# Patient Record
Sex: MALE | Race: BLACK OR AFRICAN AMERICAN | Employment: STUDENT | URBAN - METROPOLITAN AREA
[De-identification: names, ages, dates, MRNs, and addresses within clinical notes are randomized per-mention and may not be internally consistent; named-entity substitution may affect disease eponyms.]

---

## 2017-10-21 ENCOUNTER — HOSPITAL ENCOUNTER (EMERGENCY)
Age: 22
Discharge: HOME | End: 2017-10-21
Attending: PEDIATRICS
Payer: SELF-PAY

## 2017-10-21 VITALS
HEART RATE: 79 BPM | OXYGEN SATURATION: 99 % | TEMPERATURE: 99 F | RESPIRATION RATE: 18 BRPM | DIASTOLIC BLOOD PRESSURE: 91 MMHG | SYSTOLIC BLOOD PRESSURE: 145 MMHG | WEIGHT: 258.6 LBS

## 2017-10-21 DIAGNOSIS — J02.9 ACUTE PHARYNGITIS, UNSPECIFIED ETIOLOGY: Primary | ICD-10-CM

## 2017-10-21 LAB — S PYO AG THROAT QL: NEGATIVE

## 2017-10-21 PROCEDURE — 87070 CULTURE OTHR SPECIMN AEROBIC: CPT | Performed by: PEDIATRICS

## 2017-10-21 PROCEDURE — 87880 STREP A ASSAY W/OPTIC: CPT

## 2017-10-21 PROCEDURE — 99283 EMERGENCY DEPT VISIT LOW MDM: CPT

## 2017-10-21 PROCEDURE — 87147 CULTURE TYPE IMMUNOLOGIC: CPT | Performed by: PEDIATRICS

## 2017-10-21 NOTE — DISCHARGE INSTRUCTIONS
You can get over the counter Claritin-D, Zyrtec-D or Allegra-D (one of those choices will help with allergy symptoms- mucus, cough, congestion) and make sure to drink plenty of water while taking this. Chloraseptic spray as directed will help with pain as it contains a numbing agent. Cough drops and adding honey to your drink will also help suppress a cough. We will call if your strep test results positive which takes 48 hours to come back. Sore Throat: Care Instructions  Your Care Instructions    Infection by bacteria or a virus causes most sore throats. Cigarette smoke, dry air, air pollution, allergies, and yelling can also cause a sore throat. Sore throats can be painful and annoying. Fortunately, most sore throats go away on their own. If you have a bacterial infection, your doctor may prescribe antibiotics. Follow-up care is a key part of your treatment and safety. Be sure to make and go to all appointments, and call your doctor if you are having problems. It's also a good idea to know your test results and keep a list of the medicines you take. How can you care for yourself at home? · If your doctor prescribed antibiotics, take them as directed. Do not stop taking them just because you feel better. You need to take the full course of antibiotics. · Gargle with warm salt water once an hour to help reduce swelling and relieve discomfort. Use 1 teaspoon of salt mixed in 1 cup of warm water. · Take an over-the-counter pain medicine, such as acetaminophen (Tylenol), ibuprofen (Advil, Motrin), or naproxen (Aleve). Read and follow all instructions on the label. · Be careful when taking over-the-counter cold or flu medicines and Tylenol at the same time. Many of these medicines have acetaminophen, which is Tylenol. Read the labels to make sure that you are not taking more than the recommended dose. Too much acetaminophen (Tylenol) can be harmful. · Drink plenty of fluids.  Fluids may help soothe an irritated throat. Hot fluids, such as tea or soup, may help decrease throat pain. · Use over-the-counter throat lozenges to soothe pain. Regular cough drops or hard candy may also help. These should not be given to young children because of the risk of choking. · Do not smoke or allow others to smoke around you. If you need help quitting, talk to your doctor about stop-smoking programs and medicines. These can increase your chances of quitting for good. · Use a vaporizer or humidifier to add moisture to your bedroom. Follow the directions for cleaning the machine. When should you call for help? Call your doctor now or seek immediate medical care if:  · You have new or worse trouble swallowing. · Your sore throat gets much worse on one side. Watch closely for changes in your health, and be sure to contact your doctor if you do not get better as expected. Where can you learn more? Go to http://lenore-sydnee.info/. Enter [phone] [id number] in the search box to learn more about \"Sore Throat: Care Instructions. \"  Current as of: July 29, 2016  Content Version: 11.3  © 5666-4891 [employer]. Care instructions adapted under license by [employer] (which disclaims liability or warranty for this information). If you have questions about a medical condition or this instruction, always ask your healthcare professional. [address] any warranty or liability for your use of this information. Rapid Strep Test: About This Test  What is it? A rapid strep test checks the bacteria in your throat to see if strep is the cause of your sore throat. Why is this test done? It may be done so your doctor can find out right away whether you have strep throat. There is another test for strep, called a throat culture, but that test takes a few days to get the results.   How can you prepare for the test?  You don't need to do anything before you have this test.  What happens during

## 2017-10-21 NOTE — ED PROVIDER NOTES
HPI Comments: Cris Rm is a 23 y.o. Fully-vaccinated male (however refuses the flu shot), with PMH of seasonal allergies presents to emergency room ambulatory with mother c/o sore throat x 8 days, dry cough x 10 days. He states sore throat appeared to be more painful last night which prompted the visit today. He is able to tolerate his oral secretions, no voice change, no F/C, night sweats, fatigue, abd pain, nausea, vomiting, diarrhea, rash, lymphadenopathy. Of note he states his step-father's  was last night so he was doing a lot of work getting that ready. PCP: Fred Goodwin MD    Surgical hx- none  Social hx- no tobacco, no ETOH; student at [employer]    The patient has no other complaints at this time. Patient is a 23 y.o. male presenting with sore throat. The history is provided by the patient. Sore Throat    Pertinent negatives include no diarrhea, no vomiting, no headaches, no shortness of breath and no cough. Past Medical History:   Diagnosis Date    Other ill-defined conditions(339.89)     Seasonal Allergies       History reviewed. No pertinent surgical history. History reviewed. No pertinent family history. Social History     Social History    Marital status: SINGLE     Spouse name: N/A    Number of children: N/A    Years of education: N/A     Occupational History    Not on file. Social History Main Topics    Smoking status: Never Smoker    Smokeless tobacco: Never Used    Alcohol use No    Drug use: No    Sexual activity: Not on file     Other Topics Concern    Not on file     Social History Narrative         ALLERGIES: Pcn [penicillins]    Review of Systems   Constitutional: Negative. Negative for activity change, chills, fatigue and unexpected weight change. HENT: Positive for sore throat. Respiratory: Negative for cough, chest tightness, shortness of breath and wheezing. Cardiovascular: Negative.   Negative for chest pain and palpitations. Gastrointestinal: Negative. Negative for abdominal pain, diarrhea, nausea and vomiting. Genitourinary: Negative. Negative for dysuria, flank pain, frequency and hematuria. Musculoskeletal: Negative. Negative for arthralgias, back pain, neck pain and neck stiffness. Skin: Negative. Negative for color change and rash. Neurological: Negative. Negative for dizziness, numbness and headaches. Psychiatric/Behavioral: Negative. Negative for confusion. All other systems reviewed and are negative. Vitals:    10/21/17 1150   BP: (!) 145/91   Pulse: 79   Resp: 18   Temp: 99 °F (37.2 °C)   SpO2: 99%   Weight: 117.3 kg (258 lb 9.6 oz)            Physical Exam   Constitutional: He is oriented to person, place, and time. He appears well-developed and well-nourished. He is active. Non-toxic appearance. No distress. HENT:   Head: Normocephalic and atraumatic. Right Ear: External ear normal.   Left Ear: External ear normal.   Nose: Nose normal.   Mouth/Throat: No oropharyngeal exudate. BL tonsillar pillars erythematous; no exudate or edema; uvula midline; voice normal.   Eyes: Conjunctivae and EOM are normal. Pupils are equal, round, and reactive to light. Right eye exhibits no discharge. Left eye exhibits no discharge. Neck: Normal range of motion and full passive range of motion without pain. Neck supple. No tracheal tenderness present. Cardiovascular: Normal rate, regular rhythm, normal heart sounds, intact distal pulses and normal pulses. Exam reveals no gallop and no friction rub. No murmur heard. Pulmonary/Chest: Effort normal and breath sounds normal. No respiratory distress. He has no wheezes. He has no rales. He exhibits no tenderness. Abdominal: Soft. Bowel sounds are normal. He exhibits no distension. There is no tenderness. There is no rebound and no guarding. Musculoskeletal: Normal range of motion. He exhibits no edema or tenderness.    Lymphadenopathy:     He has no cervical adenopathy. Neurological: He is alert and oriented to person, place, and time. He has normal strength. No cranial nerve deficit or sensory deficit. Coordination normal.   Skin: Skin is warm, dry and intact. No abrasion and no rash noted. He is not diaphoretic. No erythema. Psychiatric: He has a normal mood and affect. His speech is normal and behavior is normal. Cognition and memory are normal.   Nursing note and vitals reviewed. MDM  Number of Diagnoses or Management Options  Diagnosis management comments:   Ddx: viral / bacterial pharyngitis, URI, bromchitis       Amount and/or Complexity of Data Reviewed  Clinical lab tests: ordered and reviewed  Review and summarize past medical records: yes  Discuss the patient with other providers: yes (ER attending)    Patient Progress  Patient progress: stable    ED Course       Procedures    11:58 AM  Patient eating a popscile. Tolerating well. Rapid strep pending. No lymphadenopathy, fatigue, fever, palpable splenomegaly, or tonsillar exudate so do not suspect mono or strep at this time; no edema, uvula midline so do not suspect PTA. Discussed symptomatic treatment, adding honey to tea and cough drops, OTC non-drowsy allergy medication encouraged and drinking plenty of liquids and to return if symptoms worsen. Sue Martins PA-C      I discussed patient's PMH, exam findings as well as careplan with the ER attending who agrees with care plan. Sue Martins PA-C      DISCHARGE NOTE:  12:44 PM  The patient's results have been reviewed with them and/or legal guardian. Patient and/or legal guardian verbally conveyed their understanding and agreement of the patient's signs, symptoms, diagnosis, treatment and prognosis and additionally agree to follow up as recommended in the discharge instructions or to return to the Emergency Room should their condition change prior to their follow-up appointment.  The patient/family verbally agrees with the care-plan and verbally conveys that all of their questions have been answered. The discharge instructions have also been provided to the patient and/or gaurdian with educational information regarding the patient's diagnosis as well a list of reasons why the patient would want to return to the ER prior to their follow-up appointment, should their condition change. Plan:  1. F/U with PCP as needed  2. Push liquids   3. symptomatic relief as instructed in discharge paperwork  Return precautions discussed with family.

## 2017-10-23 LAB
BACTERIA SPEC CULT: ABNORMAL
BACTERIA SPEC CULT: ABNORMAL
SERVICE CMNT-IMP: ABNORMAL

## 2022-12-09 ENCOUNTER — OFFICE VISIT (OUTPATIENT)
Dept: INTERNAL MEDICINE CLINIC | Age: 27
End: 2022-12-09
Payer: COMMERCIAL

## 2022-12-09 VITALS
TEMPERATURE: 98.1 F | BODY MASS INDEX: 45.1 KG/M2 | WEIGHT: 315 LBS | OXYGEN SATURATION: 99 % | DIASTOLIC BLOOD PRESSURE: 95 MMHG | SYSTOLIC BLOOD PRESSURE: 146 MMHG | RESPIRATION RATE: 16 BRPM | HEART RATE: 74 BPM | HEIGHT: 70 IN

## 2022-12-09 DIAGNOSIS — Z00.00 ROUTINE PHYSICAL EXAMINATION: ICD-10-CM

## 2022-12-09 DIAGNOSIS — E66.01 CLASS 3 SEVERE OBESITY WITHOUT SERIOUS COMORBIDITY WITH BODY MASS INDEX (BMI) OF 45.0 TO 49.9 IN ADULT, UNSPECIFIED OBESITY TYPE (HCC): ICD-10-CM

## 2022-12-09 DIAGNOSIS — Z82.49 FAMILY HISTORY OF MI (MYOCARDIAL INFARCTION): ICD-10-CM

## 2022-12-09 DIAGNOSIS — I10 PRIMARY HYPERTENSION: Primary | ICD-10-CM

## 2022-12-09 RX ORDER — AMLODIPINE BESYLATE 5 MG/1
5 TABLET ORAL DAILY
Qty: 90 TABLET | Refills: 0 | Status: SHIPPED | OUTPATIENT
Start: 2022-12-09

## 2022-12-09 NOTE — PROGRESS NOTES
[facility] Those in [facility]   Internal Medicine  [address] [facility] [address]  [place], [address] [place] [address]  [phone]      Primary Care Visit Note    Assessment/Plan:     Diagnoses and all orders for this visit:    1. Primary hypertension  -     amLODIPine (NORVASC) 5 mg tablet; Take 1 Tablet by mouth daily.  -     METABOLIC PANEL, COMPREHENSIVE; Future    2. Routine physical examination  -     CBC WITH AUTOMATED DIFF; Future  -     METABOLIC PANEL, COMPREHENSIVE; Future  -     LIPID PANEL; Future  -     HEMOGLOBIN A1C WITH EAG; Future  -     HEPATITIS C AB; Future    3. Family history of MI (myocardial infarction)  -     LIPID PANEL; Future    4. Class 3 severe obesity without serious comorbidity with body mass index (BMI) of 45.0 to 49.9 in adult, unspecified obesity type (HCC)  -     METABOLIC PANEL, COMPREHENSIVE; Future  -     LIPID PANEL; Future      Follow-up and Dispositions    Return in about 2 weeks (around 2022). - for nurse visit to check BP. Valeriy Singh MD    CC:     Chief Complaint   Patient presents with    [facility]     Fasting labs       HPI:     Lynne Phelps is a 24 y.o. male who presents to [facility]. Previously saw pediatrician Dr. Mona Rivera on [facility]. He was using [facility] as his PCP but has been going consistently. - previously had been amlodipine 5 mg but ran out of the medication earlier this year. Seasonal allergies:  - have been relatively well controlled. - just takes OTC anti-histamines when needed. No one in his family has colon or prostate cancer. Grandfather  of MI in his 52's. No thyroid disease in his family.         ROS:   Constitutional: negative for fevers, chills, anorexia and weight loss  Eyes:   negative for visual disturbance and irritation  ENT:   negative for tinnitus,sore throat,nasal congestion,ear pain,hoarseness  Respiratory:  negative for cough, hemoptysis, dyspnea,wheezing  CV: negative for chest pain, palpitations, lower extremity edema  GI:   negative for nausea, vomiting, diarrhea, abdominal pain,melena  Genitourinary: negative for frequency, dysuria and hematuria  Musculoskel: negative for myalgias, arthralgias, back pain, muscle weakness, joint pain  Neurological:  negative for headaches, dizziness, focal weakness, numbness  Psychiatric:     Negative for depression or anxiety        Past Medical History: Active Ambulatory Problems     Diagnosis Date Noted    No Active Ambulatory Problems     Resolved Ambulatory Problems     Diagnosis Date Noted    No Resolved Ambulatory Problems     Past Medical History:   Diagnosis Date    Other ill-defined conditions(307.66)           Current Medications:     Current Outpatient Medications:     amLODIPine (NORVASC) 5 mg tablet, Take 1 Tablet by mouth daily. , Disp: 90 Tablet, Rfl: 0      Past Surgical History:   History reviewed. No pertinent surgical history. Family History:   No family history on file. Social History:     Social History     Socioeconomic History    Marital status: SINGLE     Spouse name: Not on file    Number of children: Not on file    Years of education: Not on file    Highest education level: Not on file   Occupational History    Not on file   Tobacco Use    Smoking status: Never    Smokeless tobacco: Never   Vaping Use    Vaping Use: Never used   Substance and Sexual Activity    Alcohol use:  Yes     Alcohol/week: 2.0 standard drinks     Types: 1 Glasses of wine, 1 Shots of liquor per week     Comment: socially    Drug use: Never    Sexual activity: Not on file   Other Topics Concern    Not on file   Social History Narrative    Not on file     Social Determinants of Health     Financial Resource Strain: Low Risk     Difficulty of Paying Living Expenses: Not hard at all   Food Insecurity: No Food Insecurity    Worried About [address] in the Last Year: Never true    [address] in the Last Year: Never true   Transportation Needs: Not on file   Physical Activity: Not on file   Stress: Not on file   Social Connections: Not on file   Intimate Partner Violence: Not on file   Housing Stability: Not on file            Visit Vitals  BP (!) 146/95 (BP 1 Location: Right upper arm, BP Patient Position: Sitting)   Pulse 74   Temp 98.1 °F (36.7 °C) (Temporal)   Resp 16   Ht 5' 9.5\" (1.765 m)   Wt 328 lb (148.8 kg)   SpO2 99%   BMI 47.74 kg/m²       Physical Exam:   General - Well appearing male  HEENT - PERRL, TM no erythema/opacification, normal nasal turbinates, no oropharyngeal erythema or exudate, MMM  Neck - supple, no thyroidomegaly, no lymphadenopathy  Pulm - clear to auscultation bilaterally  Cardio - RRR, normal S1 S2, no murmur  Abd - soft, nontender, no masses, no HSM  Extrem - no edema, +2 distal pulses  Neuro-  Alert and oriented, No focal deficits           Labs/Imaging:

## 2022-12-09 NOTE — PROGRESS NOTES
1. \"Have you been to the ER, urgent care clinic since your last visit? Hospitalized since your last visit? \" No    2. \"Have you seen or consulted any other health care providers outside of the [address] [place] [address] since your last visit? \" No     3. For patients aged 39-70: Has the patient had a colonoscopy / FIT/ Cologuard?  NA - based on age

## 2022-12-10 LAB
ALBUMIN SERPL-MCNC: 4 G/DL (ref 3.5–5)
ALBUMIN/GLOB SERPL: 1.2 {RATIO} (ref 1.1–2.2)
ALP SERPL-CCNC: 77 U/L (ref 45–117)
ALT SERPL-CCNC: 48 U/L (ref 12–78)
ANION GAP SERPL CALC-SCNC: 3 MMOL/L (ref 5–15)
AST SERPL-CCNC: 24 U/L (ref 15–37)
BASOPHILS # BLD: 0.1 K/UL (ref 0–0.1)
BASOPHILS NFR BLD: 1 % (ref 0–1)
BILIRUB SERPL-MCNC: 0.3 MG/DL (ref 0.2–1)
BUN SERPL-MCNC: 12 MG/DL (ref 6–20)
BUN/CREAT SERPL: 10 (ref 12–20)
CALCIUM SERPL-MCNC: 9.5 MG/DL (ref 8.5–10.1)
CHLORIDE SERPL-SCNC: 107 MMOL/L (ref 97–108)
CHOLEST SERPL-MCNC: 136 MG/DL
CO2 SERPL-SCNC: 31 MMOL/L (ref 21–32)
CREAT SERPL-MCNC: 1.21 MG/DL (ref 0.7–1.3)
DIFFERENTIAL METHOD BLD: NORMAL
EOSINOPHIL # BLD: 0.4 K/UL (ref 0–0.4)
EOSINOPHIL NFR BLD: 7 % (ref 0–7)
ERYTHROCYTE [DISTWIDTH] IN BLOOD BY AUTOMATED COUNT: 12.5 % (ref 11.5–14.5)
EST. AVERAGE GLUCOSE BLD GHB EST-MCNC: 120 MG/DL
GLOBULIN SER CALC-MCNC: 3.4 G/DL (ref 2–4)
GLUCOSE SERPL-MCNC: 99 MG/DL (ref 65–100)
HBA1C MFR BLD: 5.8 % (ref 4–5.6)
HCT VFR BLD AUTO: 46 % (ref 36.6–50.3)
HCV AB SERPL QL IA: NONREACTIVE
HDLC SERPL-MCNC: 37 MG/DL
HDLC SERPL: 3.7 {RATIO} (ref 0–5)
HGB BLD-MCNC: 15 G/DL (ref 12.1–17)
IMM GRANULOCYTES # BLD AUTO: 0 K/UL (ref 0–0.04)
IMM GRANULOCYTES NFR BLD AUTO: 0 % (ref 0–0.5)
LDLC SERPL CALC-MCNC: 86.6 MG/DL (ref 0–100)
LYMPHOCYTES # BLD: 2.3 K/UL (ref 0.8–3.5)
LYMPHOCYTES NFR BLD: 42 % (ref 12–49)
MCH RBC QN AUTO: 29.5 PG (ref 26–34)
MCHC RBC AUTO-ENTMCNC: 32.6 G/DL (ref 30–36.5)
MCV RBC AUTO: 90.6 FL (ref 80–99)
MONOCYTES # BLD: 0.4 K/UL (ref 0–1)
MONOCYTES NFR BLD: 7 % (ref 5–13)
NEUTS SEG # BLD: 2.3 K/UL (ref 1.8–8)
NEUTS SEG NFR BLD: 43 % (ref 32–75)
NRBC # BLD: 0 K/UL (ref 0–0.01)
NRBC BLD-RTO: 0 PER 100 WBC
PLATELET # BLD AUTO: 319 K/UL (ref 150–400)
PMV BLD AUTO: 11.9 FL (ref 8.9–12.9)
POTASSIUM SERPL-SCNC: 4.5 MMOL/L (ref 3.5–5.1)
PROT SERPL-MCNC: 7.4 G/DL (ref 6.4–8.2)
RBC # BLD AUTO: 5.08 M/UL (ref 4.1–5.7)
SODIUM SERPL-SCNC: 141 MMOL/L (ref 136–145)
TRIGL SERPL-MCNC: 62 MG/DL (ref ?–150)
VLDLC SERPL CALC-MCNC: 12.4 MG/DL
WBC # BLD AUTO: 5.3 K/UL (ref 4.1–11.1)

## 2022-12-14 NOTE — PROGRESS NOTES
Two pt identifiers confirmed. I advised the patient per Pedro Yin, his  \"A1c is in the pre-diabetic range, keep working on improving diet and exercise. Labs otherwise look good. \"Pt verbalized understanding of information discussed w/ no further questions at this time.   Signed By: Salina Lemos LPN    December 14, 1612

## 2023-03-28 ENCOUNTER — OFFICE VISIT (OUTPATIENT)
Dept: INTERNAL MEDICINE CLINIC | Age: 28
End: 2023-03-28
Payer: COMMERCIAL

## 2023-03-28 VITALS
HEIGHT: 69 IN | RESPIRATION RATE: 18 BRPM | WEIGHT: 315 LBS | SYSTOLIC BLOOD PRESSURE: 143 MMHG | OXYGEN SATURATION: 97 % | HEART RATE: 98 BPM | BODY MASS INDEX: 46.65 KG/M2 | DIASTOLIC BLOOD PRESSURE: 94 MMHG | TEMPERATURE: 97.9 F

## 2023-03-28 DIAGNOSIS — Z09 HOSPITAL DISCHARGE FOLLOW-UP: ICD-10-CM

## 2023-03-28 DIAGNOSIS — M54.50 ACUTE RIGHT-SIDED LOW BACK PAIN WITHOUT SCIATICA: Primary | ICD-10-CM

## 2023-03-28 DIAGNOSIS — I10 PRIMARY HYPERTENSION: ICD-10-CM

## 2023-03-28 PROCEDURE — 99214 OFFICE O/P EST MOD 30 MIN: CPT | Performed by: STUDENT IN AN ORGANIZED HEALTH CARE EDUCATION/TRAINING PROGRAM

## 2023-03-28 PROCEDURE — 1111F DSCHRG MED/CURRENT MED MERGE: CPT | Performed by: STUDENT IN AN ORGANIZED HEALTH CARE EDUCATION/TRAINING PROGRAM

## 2023-03-28 RX ORDER — CYCLOBENZAPRINE HCL 10 MG
10 TABLET ORAL
Qty: 15 TABLET | Refills: 0 | Status: SHIPPED | OUTPATIENT
Start: 2023-03-28

## 2023-03-28 RX ORDER — AMLODIPINE BESYLATE 10 MG/1
10 TABLET ORAL DAILY
Qty: 90 TABLET | Refills: 1 | Status: SHIPPED | OUTPATIENT
Start: 2023-03-28

## 2023-03-28 RX ORDER — PREDNISONE 50 MG/1
50 TABLET ORAL DAILY
COMMUNITY

## 2023-03-28 RX ORDER — DICLOFENAC SODIUM 10 MG/G
GEL TOPICAL 4 TIMES DAILY
Qty: 100 G | Refills: 0 | Status: SHIPPED | OUTPATIENT
Start: 2023-03-28

## 2023-03-28 RX ORDER — CYCLOBENZAPRINE HCL 10 MG
10 TABLET ORAL
COMMUNITY
End: 2023-03-28 | Stop reason: SDUPTHER

## 2023-03-28 RX ORDER — HYDROCODONE BITARTRATE AND ACETAMINOPHEN 7.5; 325 MG/1; MG/1
1 TABLET ORAL
COMMUNITY

## 2023-03-28 NOTE — PROGRESS NOTES
1. \"Have you been to the ER, urgent care clinic since your last visit? Hospitalized since your last visit? \" Yes ER, 03/22/2023, back pain    2. \"Have you seen or consulted any other health care providers outside of the [address] [place] [address] since your last visit? \" [facility]     3. For patients aged 39-70: Has the patient had a colonoscopy / FIT/ Cologuard?  NA - based on age

## 2023-03-28 NOTE — PROGRESS NOTES
[facility] Help to Those in [facility]   Internal Medicine  [address] [facility] [address], [address] [facility]  [address]  [place], [address]  [phone]      Primary Care Visit Note    Assessment/Plan:     Diagnoses and all orders for this visit:    1. Acute right-sided low back pain without sciatica  -     diclofenac (VOLTAREN) 1 % gel; Apply  to affected area four (4) times daily.  -     REFERRAL TO ORTHOPEDICS  -     cyclobenzaprine (FLEXERIL) 10 mg tablet; Take 1 Tablet by mouth three (3) times daily as needed for Muscle Spasm(s). 2. Primary hypertension  -     amLODIPine (NORVASC) 10 mg tablet; Take 1 Tablet by mouth daily. Follow up:  3 months    Daniela Cruz MD    CC:     Chief Complaint   Patient presents with    Hospital Follow Up    Back Pain       HPI:     Belkys Barrera is a 24 y.o. male who presents for:    Back pain. Sx started on Sun, with lower right back pain. He went to chiropractor on Mon with some improvement, then ED on Wed started cyclobenzaprine, went back to the ED on Friday, CT which showed spinal stenosis per patient, started on hydrocodone and prednisone. Back pain has been more manageable since that time. He has not had back pain similar to this in the past. No radiation down his leg. 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 Pain is currently a 1/10. No loss of continence bowel or bladder, no fevers, no headaches, no loss of sensation in feet or LE weakness. Blood pressure is elevated today. Per pt was also elevated in ED. He did not follow up after last visit when amlodipine was started. ROS:   All 10 point review of systems negative except those mentioned in the HPI. Past Medical History:      Active Ambulatory Problems     Diagnosis Date Noted    No Active Ambulatory Problems     Resolved Ambulatory Problems     Diagnosis Date Noted    No Resolved Ambulatory Problems     Past Medical History:   Diagnosis Date    Other ill-defined conditions(799.89)     Spinal stenosis of lumbar region 03/24/2023          Current Medications:     Current Outpatient Medications:     HYDROcodone-acetaminophen (NORCO) 7.5-325 mg per tablet, Take 1 Tablet by mouth., Disp: , Rfl:     predniSONE (DELTASONE) 50 mg tablet, Take 50 mg by mouth daily. , Disp: , Rfl:     diclofenac (VOLTAREN) 1 % gel, Apply  to affected area four (4) times daily. , Disp: 100 g, Rfl: 0    cyclobenzaprine (FLEXERIL) 10 mg tablet, Take 1 Tablet by mouth three (3) times daily as needed for Muscle Spasm(s). , Disp: 15 Tablet, Rfl: 0    amLODIPine (NORVASC) 10 mg tablet, Take 1 Tablet by mouth daily. , Disp: 90 Tablet, Rfl: 1      Past Surgical History:   History reviewed. No pertinent surgical history. Family History:     Family History   Problem Relation Age of Onset    Hypertension Mother     Dizziness Brother          Social History:     Social History     Socioeconomic History    Marital status: SINGLE     Spouse name: Not on file    Number of children: Not on file    Years of education: Not on file    Highest education level: Not on file   Occupational History    Not on file   Tobacco Use    Smoking status: Never    Smokeless tobacco: Never   Vaping Use    Vaping Use: Never used   Substance and Sexual Activity    Alcohol use:  Yes     Alcohol/week: 2.0 standard drinks     Types: 1 Glasses of wine, 1 Shots of liquor per week     Comment: socially    Drug use: Never    Sexual activity: Not on file   Other Topics Concern    Not on file   Social History Narrative    Not on file     Social Determinants of Health     Financial Resource Strain: Low Risk     Difficulty of Paying Living Expenses: Not hard at all   Food Insecurity: No Food Insecurity    Worried About Running Out of Food in the Last Year: Never true    Ran Out of Food in the Last Year: Never true   Transportation Needs: Not on file   Physical Activity: Not on file   Stress: Not on file   Social Connections: Not on file   Intimate Partner Violence: Not on file   Housing Stability: Not on file            Visit Vitals  BP (!) 143/94 (BP 1 Location: Left upper arm, BP Patient Position: Sitting, BP Cuff Size: Adult)   Pulse 98   Temp 97.9 °F (36.6 °C) (Temporal)   Resp 18   Ht 5' 9\" (1.753 m)   Wt 323 lb (146.5 kg)   SpO2 97%   BMI 47.70 kg/m²       Physical Exam:   General - Well appearing  HEENT - EOMI, non-icteric sclera. Pulm - clear to auscultation bilaterally  Cardio - RRR, normal S1 S2, no murmur  Abd - soft, nontender, nabs  Extrem - no edema  Neuro-  Alert and oriented, No focal deficits             Labs/Imaging:     Labs and imaging reviewed by me and significant for:    Lab Results   Component Value Date/Time    WBC 5.3 12/09/2022 09:58 AM    HGB 15.0 12/09/2022 09:58 AM    HCT 46.0 12/09/2022 09:58 AM    PLATELET 636 98/91/7838 09:58 AM    MCV 90.6 12/09/2022 09:58 AM     Lab Results   Component Value Date/Time    Sodium 141 12/09/2022 09:58 AM    Potassium 4.5 12/09/2022 09:58 AM    Chloride 107 12/09/2022 09:58 AM    CO2 31 12/09/2022 09:58 AM    Anion gap 3 (L) 12/09/2022 09:58 AM    Glucose 99 12/09/2022 09:58 AM    BUN 12 12/09/2022 09:58 AM    Creatinine 1.21 12/09/2022 09:58 AM    BUN/Creatinine ratio 10 (L) 12/09/2022 09:58 AM    Calcium 9.5 12/09/2022 09:58 AM    Bilirubin, total 0.3 12/09/2022 09:58 AM    Alk.  phosphatase 77 12/09/2022 09:58 AM    Protein, total 7.4 12/09/2022 09:58 AM    Albumin 4.0 12/09/2022 09:58 AM    Globulin 3.4 12/09/2022 09:58 AM    A-G Ratio 1.2 12/09/2022 09:58 AM    ALT (SGPT) 48 12/09/2022 09:58 AM    AST (SGOT) 24 12/09/2022 09:58 AM     Lab Results   Component Value Date/Time    Hemoglobin A1c 5.8 (H) 12/09/2022 09:58 AM     Lab Results   Component Value Date/Time    Cholesterol, total 136 12/09/2022 09:58 AM    HDL Cholesterol 37 12/09/2022 09:58 AM    LDL, calculated 86.6 12/09/2022 09:58 AM    VLDL, calculated 12.4 12/09/2022 09:58 AM    Triglyceride 62 12/09/2022 09:58 AM    CHOL/HDL Ratio 3.7 12/09/2022 09:58 AM           Please note that this dictation was completed in part with [employer], the computer voice recognition software. Quite often unanticipated grammatical, syntax, homophones, and other interpretive errors are inadvertently transcribed by the computer software. Please disregard these errors. Please excuse any errors that have escaped final proofreading.

## 2023-04-13 ENCOUNTER — LAB ONLY (OUTPATIENT)
Dept: INTERNAL MEDICINE CLINIC | Age: 28
End: 2023-04-13

## 2023-04-13 DIAGNOSIS — R00.0 TACHYCARDIA: ICD-10-CM

## 2023-04-13 DIAGNOSIS — Z72.52 HIGH RISK HOMOSEXUAL BEHAVIOR: ICD-10-CM

## 2023-04-14 LAB
HBV SURFACE AG SER QL: 0.13 INDEX
HBV SURFACE AG SER QL: NEGATIVE
HCV AB SERPL QL IA: NONREACTIVE
HIV 1+2 AB+HIV1 P24 AG SERPL QL IA: NONREACTIVE
HIV12 RESULT COMMENT, HHIVC: NORMAL
T PALLIDUM AB SER QL IA: NON REACTIVE
TSH SERPL DL<=0.05 MIU/L-ACNC: 1.7 UIU/ML (ref 0.36–3.74)

## 2023-04-17 ENCOUNTER — TELEPHONE (OUTPATIENT)
Dept: INTERNAL MEDICINE CLINIC | Age: 28
End: 2023-04-17

## 2023-04-17 DIAGNOSIS — Z72.52 HIGH RISK HOMOSEXUAL BEHAVIOR: Primary | ICD-10-CM

## 2023-04-17 LAB
C TRACH RRNA SPEC QL NAA+PROBE: NORMAL
N GONORRHOEA RRNA SPEC QL NAA+PROBE: NEGATIVE
SPECIMEN SOURCE: NORMAL
T VAGINALIS RRNA SPEC QL NAA+PROBE: NEGATIVE

## 2023-04-17 NOTE — PROGRESS NOTES
[employer] message sent a this time. Verified active. Per Dr. Fermin Caldera:  STD screening was negative.

## 2023-04-18 ENCOUNTER — DOCUMENTATION ONLY (OUTPATIENT)
Dept: INTERNAL MEDICINE CLINIC | Age: 28
End: 2023-04-18

## 2023-04-18 ENCOUNTER — TELEPHONE (OUTPATIENT)
Dept: INTERNAL MEDICINE CLINIC | Age: 28
End: 2023-04-18

## 2023-04-18 NOTE — TELEPHONE ENCOUNTER
KRIS request was faxed to [facility] department on 4/10/23.  Received response from [address] stating that they are unable to fulfill the request due to the hospital/facility name is missing from the KRIS request.

## 2023-04-18 NOTE — TELEPHONE ENCOUNTER
Attempted to contact patient to obtain the name of the hospital where he was recently seen. Generic voicemail. Left message for patient to return call to office.

## 2023-04-18 NOTE — TELEPHONE ENCOUNTER
Pt states he was seen at the same place as last time, [facility].    This is the only info he had. If you need something further please call pt back. Thanks.

## 2023-04-20 ENCOUNTER — DOCUMENTATION ONLY (OUTPATIENT)
Dept: INTERNAL MEDICINE CLINIC | Age: 28
End: 2023-04-20

## 2023-04-25 ENCOUNTER — OFFICE VISIT (OUTPATIENT)
Dept: INTERNAL MEDICINE CLINIC | Age: 28
End: 2023-04-25
Payer: COMMERCIAL

## 2023-04-25 VITALS
HEIGHT: 69 IN | SYSTOLIC BLOOD PRESSURE: 138 MMHG | HEART RATE: 74 BPM | TEMPERATURE: 97.3 F | OXYGEN SATURATION: 99 % | BODY MASS INDEX: 45.62 KG/M2 | WEIGHT: 308 LBS | DIASTOLIC BLOOD PRESSURE: 84 MMHG | RESPIRATION RATE: 17 BRPM

## 2023-04-25 DIAGNOSIS — Z72.52 HIGH RISK HOMOSEXUAL BEHAVIOR: Primary | ICD-10-CM

## 2023-04-25 PROCEDURE — 99213 OFFICE O/P EST LOW 20 MIN: CPT | Performed by: STUDENT IN AN ORGANIZED HEALTH CARE EDUCATION/TRAINING PROGRAM

## 2023-04-25 RX ORDER — EMTRICITABINE AND TENOFOVIR ALAFENAMIDE 200; 25 MG/1; MG/1
1 TABLET ORAL DAILY
Qty: 90 TABLET | Refills: 1 | Status: SHIPPED | OUTPATIENT
Start: 2023-04-25

## 2023-04-25 NOTE — PROGRESS NOTES
Good Help to Those in [facility]   Internal Medicine  [address] [facility] [address], [address] [facility]  [address]  [place], [address] [facility]  [phone]      Primary Care Visit Note    Assessment/Plan:     High risk homosexual behavior-would like to discuss starting PrEP. - repeat chlamydia testing which was indeterminate.  - Start descovy  - continue routine STD testing [id number]    HTN - borderline controlled with losartan 25 mg daily. Send in home results in 2-4 weeks. Kidney stones - symptoms resolved. Refer to urology if sx return. Follow up:  - Every 3 months for routine STD screening while on PrEP.  - HTN management    Shawn Tsang MD    CC:     Chief Complaint   Patient presents with    Medication Evaluation       HPI:     Vadim Torres is a 34 y.o. male who presents for:    High risk sexual behavior - he states that he currently only has one male sexual partner. Chlamydia testing came back indeterminate which he is planning to repeat today. HTN - blood pressure 160/108 last night, he has checked it a total of 4 times since starting the medication and has been running high. Otherwise he has been doing well with the losartan, and denies any issues with it. He is taking fiber supplement and would like to know if there are any issues taking it with his medications. ROS:   All 10 point review of systems negative except those mentioned in the HPI. Past Medical History: Active Ambulatory Problems     Diagnosis Date Noted    No Active Ambulatory Problems     Resolved Ambulatory Problems     Diagnosis Date Noted    No Resolved Ambulatory Problems     Past Medical History:   Diagnosis Date    Other ill-defined conditions(799.89)     Spinal stenosis of lumbar region 03/24/2023          Current Medications:     Current Outpatient Medications:     emtricitabine-tenofovir alafen (Descovy) tablet, Take 1 Tablet by mouth daily. , Disp: 90 Tablet, Rfl: 1    losartan (COZAAR) 25 mg tablet, Take 1 Tablet by mouth daily. , Disp: 30 Tablet, Rfl: 2      Past Surgical History:   History reviewed. No pertinent surgical history. Family History:     Family History   Problem Relation Age of Onset    Hypertension Mother     Dizziness Brother          Social History:     Social History     Socioeconomic History    Marital status: SINGLE     Spouse name: Not on file    Number of children: Not on file    Years of education: Not on file    Highest education level: Not on file   Occupational History    Not on file   Tobacco Use    Smoking status: Never    Smokeless tobacco: Never   Vaping Use    Vaping Use: Never used   Substance and Sexual Activity    Alcohol use: Yes     Alcohol/week: 2.0 standard drinks     Types: 1 Glasses of wine, 1 Shots of liquor per week     Comment: socially    Drug use: Never    Sexual activity: Not on file   Other Topics Concern    Not on file   Social History Narrative    Not on file     Social Determinants of Health     Financial Resource Strain: Low Risk     Difficulty of Paying Living Expenses: Not hard at all   Food Insecurity: No Food Insecurity    Worried About Running Out of Food in the Last Year: Never true    Ran Out of Food in the Last Year: Never true   Transportation Needs: Not on file   Physical Activity: Not on file   Stress: Not on file   Social Connections: Not on file   Intimate Partner Violence: Not on file   Housing Stability: Not on file            Visit Vitals  /84 (BP 1 Location: Left upper arm, BP Patient Position: Sitting, BP Cuff Size: Adult)   Pulse 74   Temp 97.3 °F (36.3 °C) (Temporal)   Resp 17   Ht 5' 9\" (1.753 m)   Wt 308 lb (139.7 kg)   SpO2 99%   BMI 45.48 kg/m²       Physical Exam:   General - Well appearing  HEENT - EOMI, non-icteric sclera.    Pulm - clear to auscultation bilaterally  Cardio - RRR, normal S1 S2, no murmur  Abd - soft, nontender, nabs  Extrem - no edema  Neuro-  Alert and oriented, No focal deficits Labs/Imaging:     Labs and imaging reviewed by me and significant for:    EKG: Sinus tachycardia. Lab Results   Component Value Date/Time    WBC 5.3 12/09/2022 09:58 AM    HGB 15.0 12/09/2022 09:58 AM    HCT 46.0 12/09/2022 09:58 AM    PLATELET 315 64/87/5478 09:58 AM    MCV 90.6 12/09/2022 09:58 AM     Lab Results   Component Value Date/Time    Sodium 141 12/09/2022 09:58 AM    Potassium 4.5 12/09/2022 09:58 AM    Chloride 107 12/09/2022 09:58 AM    CO2 31 12/09/2022 09:58 AM    Anion gap 3 (L) 12/09/2022 09:58 AM    Glucose 99 12/09/2022 09:58 AM    BUN 12 12/09/2022 09:58 AM    Creatinine 1.21 12/09/2022 09:58 AM    BUN/Creatinine ratio 10 (L) 12/09/2022 09:58 AM    Calcium 9.5 12/09/2022 09:58 AM    Bilirubin, total 0.3 12/09/2022 09:58 AM    Alk. phosphatase 77 12/09/2022 09:58 AM    Protein, total 7.4 12/09/2022 09:58 AM    Albumin 4.0 12/09/2022 09:58 AM    Globulin 3.4 12/09/2022 09:58 AM    A-G Ratio 1.2 12/09/2022 09:58 AM    ALT (SGPT) 48 12/09/2022 09:58 AM    AST (SGOT) 24 12/09/2022 09:58 AM     Lab Results   Component Value Date/Time    Hemoglobin A1c 5.8 (H) 12/09/2022 09:58 AM     Lab Results   Component Value Date/Time    Cholesterol, total 136 12/09/2022 09:58 AM    HDL Cholesterol 37 12/09/2022 09:58 AM    LDL, calculated 86.6 12/09/2022 09:58 AM    VLDL, calculated 12.4 12/09/2022 09:58 AM    Triglyceride 62 12/09/2022 09:58 AM    CHOL/HDL Ratio 3.7 12/09/2022 09:58 AM           Please note that this dictation was completed in part with [employer], the [employer] voice recognition software. Quite often unanticipated grammatical, syntax, homophones, and other interpretive errors are inadvertently transcribed by the computer software. Please disregard these errors. Please excuse any errors that have escaped final proofreading.

## 2023-04-25 NOTE — PROGRESS NOTES
1. \"Have you been to the ER, urgent care clinic since your last visit? Hospitalized since your last visit? \" No    2. \"Have you seen or consulted any other health care providers outside of the [address] [place] [address] since your last visit? \" No     3. For patients aged 39-70: Has the patient had a colonoscopy / FIT/ Cologuard?  NA - based on age

## 2023-04-28 LAB
C TRACH RRNA SPEC QL NAA+PROBE: NEGATIVE
N GONORRHOEA RRNA SPEC QL NAA+PROBE: NEGATIVE
SPECIMEN SOURCE: NORMAL
T VAGINALIS RRNA SPEC QL NAA+PROBE: NEGATIVE

## 2023-05-08 ENCOUNTER — PATIENT MESSAGE (OUTPATIENT)
Age: 28
End: 2023-05-08

## 2023-05-08 DIAGNOSIS — Z72.52 HIGH RISK HOMOSEXUAL BEHAVIOR: Primary | ICD-10-CM

## 2023-05-09 RX ORDER — EMTRICITABINE AND TENOFOVIR ALAFENAMIDE 200; 25 MG/1; MG/1
1 TABLET ORAL DAILY
Qty: 30 TABLET | Refills: 2 | Status: SHIPPED | OUTPATIENT
Start: 2023-05-09

## 2023-05-09 NOTE — TELEPHONE ENCOUNTER
From: Elivs Wilhelm  To: Dr. Detra Gilford: 5/8/2023 10:44 AM EDT  Subject: Transferring Prescription    Good Morning Dr. Deepti Hill,    I hope all is well. I am reaching out because I no longer have [employer] and I am currently n the process of transferring my prescriptions to [facility] at [address]. I know that [address] [place] [address] sent over a request for paperwork for the Descovy PreP to be completed. Could you send the prescription to the [facility] listed above and I will call the [address] [place] [address] and inform them to cancel the request. Thanks in advance!

## 2023-05-09 NOTE — TELEPHONE ENCOUNTER
HISTORY OF PRESENT ILLNESS   Roro Gray   is a 34 y.o.  male. {Choose one or more SmartLinks; press DELETE if none desired:[id number]}   No results found for any visits on 05/08/23. [unfilled]     [unfilled]     ASSESSMENT and PLAN  There are no diagnoses linked to this encounter.      Deyvi Reyes MD

## 2023-05-10 ENCOUNTER — CLINICAL DOCUMENTATION (OUTPATIENT)
Age: 28
End: 2023-05-10

## 2023-05-10 NOTE — PROGRESS NOTES
Received SHARLENE request from [facility] on 5/9/23. Faxed request to Ci on 5/10/23.

## 2023-05-18 RX ORDER — LOSARTAN POTASSIUM 25 MG/1
25 TABLET ORAL DAILY
COMMUNITY
Start: 2023-04-11 | End: 2023-07-07

## 2023-07-07 RX ORDER — LOSARTAN POTASSIUM 25 MG/1
TABLET ORAL
Qty: 30 TABLET | Refills: 1 | Status: SHIPPED | OUTPATIENT
Start: 2023-07-07

## 2023-07-29 SDOH — ECONOMIC STABILITY: HOUSING INSECURITY
IN THE LAST 12 MONTHS, WAS THERE A TIME WHEN YOU DID NOT HAVE A STEADY PLACE TO SLEEP OR SLEPT IN A SHELTER (INCLUDING NOW)?: NO

## 2023-07-29 SDOH — ECONOMIC STABILITY: FOOD INSECURITY: WITHIN THE PAST 12 MONTHS, THE FOOD YOU BOUGHT JUST DIDN'T LAST AND YOU DIDN'T HAVE MONEY TO GET MORE.: NEVER TRUE

## 2023-07-29 SDOH — ECONOMIC STABILITY: TRANSPORTATION INSECURITY
IN THE PAST 12 MONTHS, HAS LACK OF TRANSPORTATION KEPT YOU FROM MEETINGS, WORK, OR FROM GETTING THINGS NEEDED FOR DAILY LIVING?: NO

## 2023-07-29 SDOH — ECONOMIC STABILITY: INCOME INSECURITY: HOW HARD IS IT FOR YOU TO PAY FOR THE VERY BASICS LIKE FOOD, HOUSING, MEDICAL CARE, AND HEATING?: NOT VERY HARD

## 2023-07-29 SDOH — ECONOMIC STABILITY: FOOD INSECURITY: WITHIN THE PAST 12 MONTHS, YOU WORRIED THAT YOUR FOOD WOULD RUN OUT BEFORE YOU GOT MONEY TO BUY MORE.: NEVER TRUE

## 2023-08-01 ENCOUNTER — OFFICE VISIT (OUTPATIENT)
Age: 28
End: 2023-08-01
Payer: COMMERCIAL

## 2023-08-01 VITALS
HEART RATE: 83 BPM | RESPIRATION RATE: 15 BRPM | SYSTOLIC BLOOD PRESSURE: 132 MMHG | BODY MASS INDEX: 45.32 KG/M2 | WEIGHT: 306 LBS | HEIGHT: 69 IN | DIASTOLIC BLOOD PRESSURE: 81 MMHG | TEMPERATURE: 97.4 F | OXYGEN SATURATION: 99 %

## 2023-08-01 DIAGNOSIS — Z72.52 HIGH RISK HOMOSEXUAL BEHAVIOR: Primary | ICD-10-CM

## 2023-08-01 DIAGNOSIS — I10 ESSENTIAL (PRIMARY) HYPERTENSION: ICD-10-CM

## 2023-08-01 DIAGNOSIS — R73.03 PREDIABETES: ICD-10-CM

## 2023-08-01 PROCEDURE — 3075F SYST BP GE 130 - 139MM HG: CPT | Performed by: STUDENT IN AN ORGANIZED HEALTH CARE EDUCATION/TRAINING PROGRAM

## 2023-08-01 PROCEDURE — 3079F DIAST BP 80-89 MM HG: CPT | Performed by: STUDENT IN AN ORGANIZED HEALTH CARE EDUCATION/TRAINING PROGRAM

## 2023-08-01 PROCEDURE — 99214 OFFICE O/P EST MOD 30 MIN: CPT | Performed by: STUDENT IN AN ORGANIZED HEALTH CARE EDUCATION/TRAINING PROGRAM

## 2023-08-01 RX ORDER — LOSARTAN POTASSIUM 25 MG/1
25 TABLET ORAL DAILY
Qty: 90 TABLET | Refills: 1 | Status: SHIPPED | OUTPATIENT
Start: 2023-08-01

## 2023-08-01 ASSESSMENT — PATIENT HEALTH QUESTIONNAIRE - PHQ9
SUM OF ALL RESPONSES TO PHQ QUESTIONS 1-9: 0
SUM OF ALL RESPONSES TO PHQ QUESTIONS 1-9: 0
2. FEELING DOWN, DEPRESSED OR HOPELESS: 0
1. LITTLE INTEREST OR PLEASURE IN DOING THINGS: 0
SUM OF ALL RESPONSES TO PHQ QUESTIONS 1-9: 0
SUM OF ALL RESPONSES TO PHQ QUESTIONS 1-9: 0
SUM OF ALL RESPONSES TO PHQ9 QUESTIONS 1 & 2: 0

## 2023-08-01 NOTE — ASSESSMENT & PLAN NOTE
Pt planning to get started on trying to lose weight. Will consider medically supervised wt loss program if not improving.

## 2023-08-02 LAB
EST. AVERAGE GLUCOSE BLD GHB EST-MCNC: 111 MG/DL
HBA1C MFR BLD: 5.5 % (ref 4–5.6)
HBV SURFACE AG SER QL: <0.1 INDEX
HBV SURFACE AG SER QL: NEGATIVE
HCV AB SERPL QL IA: NONREACTIVE
HIV 1+2 AB+HIV1 P24 AG SERPL QL IA: NONREACTIVE
HIV 1/2 RESULT COMMENT: NORMAL

## 2023-08-03 LAB — T PALLIDUM AB SER QL IA: NON REACTIVE

## 2023-08-06 DIAGNOSIS — Z72.52 HIGH RISK HOMOSEXUAL BEHAVIOR: ICD-10-CM

## 2023-08-07 RX ORDER — EMTRICITABINE AND TENOFOVIR ALAFENAMIDE 200; 25 MG/1; MG/1
TABLET ORAL
Qty: 30 TABLET | Refills: 2 | Status: SHIPPED | OUTPATIENT
Start: 2023-08-07

## 2023-11-02 ENCOUNTER — OFFICE VISIT (OUTPATIENT)
Age: 28
End: 2023-11-02
Payer: COMMERCIAL

## 2023-11-02 VITALS
RESPIRATION RATE: 16 BRPM | DIASTOLIC BLOOD PRESSURE: 89 MMHG | TEMPERATURE: 97.2 F | BODY MASS INDEX: 45.68 KG/M2 | OXYGEN SATURATION: 98 % | HEIGHT: 69 IN | WEIGHT: 308.4 LBS | HEART RATE: 86 BPM | SYSTOLIC BLOOD PRESSURE: 131 MMHG

## 2023-11-02 DIAGNOSIS — Z72.52 HIGH RISK HOMOSEXUAL BEHAVIOR: ICD-10-CM

## 2023-11-02 PROCEDURE — 3075F SYST BP GE 130 - 139MM HG: CPT | Performed by: STUDENT IN AN ORGANIZED HEALTH CARE EDUCATION/TRAINING PROGRAM

## 2023-11-02 PROCEDURE — 3079F DIAST BP 80-89 MM HG: CPT | Performed by: STUDENT IN AN ORGANIZED HEALTH CARE EDUCATION/TRAINING PROGRAM

## 2023-11-02 PROCEDURE — 99213 OFFICE O/P EST LOW 20 MIN: CPT | Performed by: STUDENT IN AN ORGANIZED HEALTH CARE EDUCATION/TRAINING PROGRAM

## 2023-11-02 RX ORDER — EMTRICITABINE AND TENOFOVIR ALAFENAMIDE 200; 25 MG/1; MG/1
1 TABLET ORAL DAILY
Qty: 30 TABLET | Refills: 2 | Status: SHIPPED | OUTPATIENT
Start: 2023-11-02

## 2023-11-03 LAB
HBV SURFACE AG SER QL: <0.1 INDEX
HBV SURFACE AG SER QL: NEGATIVE
HCV AB SER IA-ACNC: 0.04 INDEX
HCV AB SERPL QL IA: NONREACTIVE
HIV 1+2 AB+HIV1 P24 AG SERPL QL IA: NONREACTIVE
HIV 1/2 RESULT COMMENT: NORMAL

## 2023-11-04 LAB
HSV1 IGG SER IA-ACNC: 1.31 INDEX (ref 0–0.9)
HSV2 IGG SER IA-ACNC: <0.91 INDEX (ref 0–0.9)

## 2023-11-05 LAB — T PALLIDUM AB SER QL IA: NON REACTIVE

## 2023-12-01 ENCOUNTER — TELEPHONE (OUTPATIENT)
Age: 28
End: 2023-12-01

## 2023-12-01 NOTE — TELEPHONE ENCOUNTER
Called to pt and left Voice mail to schedule  an apt with patient looking at Dec 8 @ 9:30 or Dec 13 @10:00

## 2023-12-01 NOTE — TELEPHONE ENCOUNTER
----- Message from Karoline Juli sent at 12/1/2023 10:44 AM EST -----  Subject: Appointment Request    Reason for Call: Established Patient Appointment needed: Routine ED Follow   Up Visit    QUESTIONS    Reason for appointment request? No appointments available during search     Additional Information for Provider? patient called and needs a ER follow   up seen 11/29/23 diarrhea, doing good , please call   ---------------------------------------------------------------------------  --------------  [address]  [phone]; OK to leave message on voicemail  ---------------------------------------------------------------------------  --------------  SCRIPT ANSWERS

## 2023-12-05 ENCOUNTER — TELEPHONE (OUTPATIENT)
Age: 28
End: 2023-12-05

## 2023-12-05 NOTE — TELEPHONE ENCOUNTER
----- Message from Patricia Tobar sent at 12/5/2023 12:01 PM EST -----  Subject: Message to Provider    QUESTIONS  Information for Provider? pt is calling to r/s his hospital follow up,   says any tues/thursday works for him to rescheduled too. please advise,   says its okay to just schedule and leave on VM if he doesnt answer. ---------------------------------------------------------------------------  --------------  Nikolas LEIVA  [phone]; OK to leave message on voicemail  ---------------------------------------------------------------------------  --------------  SCRIPT ANSWERS  Relationship to Patient?  Self

## 2024-01-30 ENCOUNTER — OFFICE VISIT (OUTPATIENT)
Age: 29
End: 2024-01-30
Payer: COMMERCIAL

## 2024-01-30 VITALS
WEIGHT: 303.6 LBS | HEART RATE: 90 BPM | OXYGEN SATURATION: 98 % | HEIGHT: 69 IN | DIASTOLIC BLOOD PRESSURE: 83 MMHG | BODY MASS INDEX: 44.97 KG/M2 | RESPIRATION RATE: 16 BRPM | SYSTOLIC BLOOD PRESSURE: 140 MMHG | TEMPERATURE: 97.4 F

## 2024-01-30 DIAGNOSIS — I10 ESSENTIAL (PRIMARY) HYPERTENSION: Primary | ICD-10-CM

## 2024-01-30 DIAGNOSIS — Z72.52 HIGH RISK HOMOSEXUAL BEHAVIOR: ICD-10-CM

## 2024-01-30 PROCEDURE — 3079F DIAST BP 80-89 MM HG: CPT | Performed by: STUDENT IN AN ORGANIZED HEALTH CARE EDUCATION/TRAINING PROGRAM

## 2024-01-30 PROCEDURE — 99214 OFFICE O/P EST MOD 30 MIN: CPT | Performed by: STUDENT IN AN ORGANIZED HEALTH CARE EDUCATION/TRAINING PROGRAM

## 2024-01-30 PROCEDURE — 3077F SYST BP >= 140 MM HG: CPT | Performed by: STUDENT IN AN ORGANIZED HEALTH CARE EDUCATION/TRAINING PROGRAM

## 2024-01-30 RX ORDER — EMTRICITABINE AND TENOFOVIR ALAFENAMIDE 200; 25 MG/1; MG/1
1 TABLET ORAL DAILY
Qty: 30 TABLET | Refills: 2 | Status: SHIPPED | OUTPATIENT
Start: 2024-01-30

## 2024-01-30 RX ORDER — LOSARTAN POTASSIUM 50 MG/1
50 TABLET ORAL DAILY
Qty: 90 TABLET | Refills: 1 | Status: SHIPPED | OUTPATIENT
Start: 2024-01-30

## 2024-01-30 ASSESSMENT — PATIENT HEALTH QUESTIONNAIRE - PHQ9
SUM OF ALL RESPONSES TO PHQ QUESTIONS 1-9: 0
2. FEELING DOWN, DEPRESSED OR HOPELESS: 0
SUM OF ALL RESPONSES TO PHQ QUESTIONS 1-9: 0
SUM OF ALL RESPONSES TO PHQ9 QUESTIONS 1 & 2: 0
SUM OF ALL RESPONSES TO PHQ QUESTIONS 1-9: 0
SUM OF ALL RESPONSES TO PHQ QUESTIONS 1-9: 0
1. LITTLE INTEREST OR PLEASURE IN DOING THINGS: 0

## 2024-01-30 NOTE — PROGRESS NOTES
tablet, Take 1 tablet by mouth daily, Disp: 30 tablet, Rfl: 2    losartan (COZAAR) 25 MG tablet, Take 1 tablet by mouth daily, Disp: 90 tablet, Rfl: 1      ASSESSMENT and PLAN  1. Essential (primary) hypertension  Assessment & Plan:  Uncontrolled, increase losartan to 50 mg daily.   Orders:  -     CBC with Auto Differential; Future  -     Comprehensive Metabolic Panel; Future  2. High risk homosexual behavior  -     Hepatitis C Antibody; Future  -     T Pallidum Screen W/Reflex; Future  -     Chlamydia, Gonorrhea, Trichomoniasis; Future  -     Hepatitis B Surface Antigen; Future  -     HIV 1/2 Ag/Ab, 4TH Generation,W Rflx Confirm; Future  -     losartan (COZAAR) 50 MG tablet; Take 1 tablet by mouth daily, Disp-90 tablet, R-1Normal  -     emtricitabine-tenofovir alafenamide (DESCOVY) 200-25 MG TABS tablet; Take 1 tablet by mouth daily, Disp-30 tablet, R-2Normal     Follow up:  Return in about 3 months (around 4/30/2024).    Wilfred Garcia MD

## 2024-01-31 LAB
ALBUMIN SERPL-MCNC: 4 G/DL (ref 3.5–5)
ALBUMIN/GLOB SERPL: 1.1 (ref 1.1–2.2)
ALP SERPL-CCNC: 78 U/L (ref 45–117)
ALT SERPL-CCNC: 53 U/L (ref 12–78)
ANION GAP SERPL CALC-SCNC: 3 MMOL/L (ref 5–15)
AST SERPL-CCNC: 22 U/L (ref 15–37)
BASOPHILS # BLD: 0.1 K/UL (ref 0–0.1)
BASOPHILS NFR BLD: 1 % (ref 0–1)
BILIRUB SERPL-MCNC: 0.3 MG/DL (ref 0.2–1)
BUN SERPL-MCNC: 13 MG/DL (ref 6–20)
BUN/CREAT SERPL: 10 (ref 12–20)
CALCIUM SERPL-MCNC: 9.2 MG/DL (ref 8.5–10.1)
CHLORIDE SERPL-SCNC: 107 MMOL/L (ref 97–108)
CO2 SERPL-SCNC: 30 MMOL/L (ref 21–32)
CREAT SERPL-MCNC: 1.29 MG/DL (ref 0.7–1.3)
DIFFERENTIAL METHOD BLD: NORMAL
EOSINOPHIL # BLD: 0.3 K/UL (ref 0–0.4)
EOSINOPHIL NFR BLD: 5 % (ref 0–7)
ERYTHROCYTE [DISTWIDTH] IN BLOOD BY AUTOMATED COUNT: 12.3 % (ref 11.5–14.5)
GLOBULIN SER CALC-MCNC: 3.7 G/DL (ref 2–4)
GLUCOSE SERPL-MCNC: 100 MG/DL (ref 65–100)
HBV SURFACE AG SER QL: <0.1 INDEX
HBV SURFACE AG SER QL: NEGATIVE
HCT VFR BLD AUTO: 44.2 % (ref 36.6–50.3)
HCV AB SER IA-ACNC: 0.16 INDEX
HCV AB SERPL QL IA: NONREACTIVE
HGB BLD-MCNC: 14.8 G/DL (ref 12.1–17)
HIV 1+2 AB+HIV1 P24 AG SERPL QL IA: NONREACTIVE
HIV 1/2 RESULT COMMENT: NORMAL
IMM GRANULOCYTES # BLD AUTO: 0 K/UL (ref 0–0.04)
IMM GRANULOCYTES NFR BLD AUTO: 0 % (ref 0–0.5)
LYMPHOCYTES # BLD: 2.4 K/UL (ref 0.8–3.5)
LYMPHOCYTES NFR BLD: 48 % (ref 12–49)
MCH RBC QN AUTO: 30 PG (ref 26–34)
MCHC RBC AUTO-ENTMCNC: 33.5 G/DL (ref 30–36.5)
MCV RBC AUTO: 89.7 FL (ref 80–99)
MONOCYTES # BLD: 0.3 K/UL (ref 0–1)
MONOCYTES NFR BLD: 5 % (ref 5–13)
NEUTS SEG # BLD: 2.1 K/UL (ref 1.8–8)
NEUTS SEG NFR BLD: 41 % (ref 32–75)
NRBC # BLD: 0 K/UL (ref 0–0.01)
NRBC BLD-RTO: 0 PER 100 WBC
PLATELET # BLD AUTO: 288 K/UL (ref 150–400)
PMV BLD AUTO: 12 FL (ref 8.9–12.9)
POTASSIUM SERPL-SCNC: 4.5 MMOL/L (ref 3.5–5.1)
PROT SERPL-MCNC: 7.7 G/DL (ref 6.4–8.2)
RBC # BLD AUTO: 4.93 M/UL (ref 4.1–5.7)
SODIUM SERPL-SCNC: 140 MMOL/L (ref 136–145)
WBC # BLD AUTO: 5.1 K/UL (ref 4.1–11.1)

## 2024-02-01 LAB — T PALLIDUM AB SER QL IA: NON REACTIVE

## 2024-02-08 DIAGNOSIS — Z72.52 HIGH RISK HOMOSEXUAL BEHAVIOR: ICD-10-CM

## 2024-02-08 RX ORDER — LOSARTAN POTASSIUM 25 MG/1
25 TABLET ORAL DAILY
Qty: 90 TABLET | Refills: 1 | OUTPATIENT
Start: 2024-02-08

## 2024-04-10 ENCOUNTER — NURSE ONLY (OUTPATIENT)
Age: 29
End: 2024-04-10

## 2024-04-10 DIAGNOSIS — H61.23 BILATERAL IMPACTED CERUMEN: Primary | ICD-10-CM

## 2024-04-10 NOTE — PROGRESS NOTES
Procedure note: Ear flushing  Performed by Jessie Menjivar MA .  Cerumen impaction noted and irrigation was indicated.  Risks and benefits of procedure explained and verbal consent from patient was obtained  Performed cerumen removal by irrigation (w/ H2O and Peroxide mixture) and curette on only Left ear. No trauma or complications noted. Expressed understanding of and agreement with plan and all questions answered.           The procedure was tolerated well but impaction was too deep so after PCP examining both ear canal pt was referred to ENT for further evaluation.

## 2024-05-15 ENCOUNTER — OFFICE VISIT (OUTPATIENT)
Age: 29
End: 2024-05-15
Payer: COMMERCIAL

## 2024-05-15 VITALS
SYSTOLIC BLOOD PRESSURE: 129 MMHG | DIASTOLIC BLOOD PRESSURE: 86 MMHG | WEIGHT: 306 LBS | BODY MASS INDEX: 45.19 KG/M2 | HEART RATE: 78 BPM | OXYGEN SATURATION: 100 % | TEMPERATURE: 98 F

## 2024-05-15 DIAGNOSIS — Z72.52 HIGH RISK HOMOSEXUAL BEHAVIOR: ICD-10-CM

## 2024-05-15 PROCEDURE — 3074F SYST BP LT 130 MM HG: CPT | Performed by: STUDENT IN AN ORGANIZED HEALTH CARE EDUCATION/TRAINING PROGRAM

## 2024-05-15 PROCEDURE — 3079F DIAST BP 80-89 MM HG: CPT | Performed by: STUDENT IN AN ORGANIZED HEALTH CARE EDUCATION/TRAINING PROGRAM

## 2024-05-15 PROCEDURE — 99213 OFFICE O/P EST LOW 20 MIN: CPT | Performed by: STUDENT IN AN ORGANIZED HEALTH CARE EDUCATION/TRAINING PROGRAM

## 2024-05-15 RX ORDER — EMTRICITABINE AND TENOFOVIR ALAFENAMIDE 200; 25 MG/1; MG/1
1 TABLET ORAL DAILY
Qty: 30 TABLET | Refills: 3 | Status: SHIPPED | OUTPATIENT
Start: 2024-05-15

## 2024-05-15 NOTE — PROGRESS NOTES
HISTORY OF PRESENT ILLNESS   Nora Good is a 29 y.o. male who presents for    High risk homosexual behavior: RECALL on PrEP, no SE of the medication. Pt has been counseled on safe sex practices.   - Today denies sores, bumps, rashes on genital or rectal area. No pain with urination or discharge from the penis or rectum. No bloody stool, or malodorous urine. No painful or swollen lymph nodes.      HTN: RECALL recently has been borderline controlled with losartan 25 mg daily.   Tolerating losartan. Not checking at home.      Obesity: recall - He has not been actively trying to lose weight but he has been watching what he eats. He has had a lot of other things going on, especially figuring his finances.     Kidney stones: symptoms resolved, quiescent recently.     HM:   - refuses flu shot  - advised on obtaining covid booster.     SOCIAL HISTORY: works full time at [employer].     /86   Pulse 78   Temp 98 °F (36.7 °C) (Temporal)   Wt (!) 138.8 kg (306 lb)   SpO2 100%   BMI 45.19 kg/m²       Physical Exam  Constitutional:       General: He is not in acute distress.     Appearance: Normal appearance. He is not toxic-appearing.   HENT:      Head: Normocephalic and atraumatic.      Mouth/Throat:      Pharynx: No posterior oropharyngeal erythema.   Eyes:      General: No scleral icterus.     Extraocular Movements: Extraocular movements intact.      Conjunctiva/sclera:      Left eye: Left conjunctiva is not injected.   Cardiovascular:      Rate and Rhythm: Normal rate.   Pulmonary:      Effort: Pulmonary effort is normal.   Musculoskeletal:      Cervical back: No tenderness.   Skin:     General: Skin is warm and dry.   Neurological:      General: No focal deficit present.      Mental Status: He is alert.   Psychiatric:         Mood and Affect: Mood normal.            Current Outpatient Medications:     losartan (COZAAR) 50 MG tablet, Take 1 tablet by mouth daily, Disp: 90 tablet, Rfl: 1

## 2024-05-21 ENCOUNTER — NURSE ONLY (OUTPATIENT)
Age: 29
End: 2024-05-21

## 2024-05-21 DIAGNOSIS — Z72.52 HIGH RISK HOMOSEXUAL BEHAVIOR: ICD-10-CM

## 2024-05-21 LAB
HBV SURFACE AG SER QL: <0.1 INDEX
HBV SURFACE AG SER QL: NEGATIVE
HCV AB SER IA-ACNC: 0.05 INDEX
HCV AB SERPL QL IA: NONREACTIVE
HIV 1+2 AB+HIV1 P24 AG SERPL QL IA: NONREACTIVE
HIV 1/2 RESULT COMMENT: NORMAL

## 2024-05-22 LAB — T PALLIDUM AB SER QL IA: NON REACTIVE

## 2024-07-17 ENCOUNTER — PATIENT MESSAGE (OUTPATIENT)
Age: 29
End: 2024-07-17

## 2024-07-17 DIAGNOSIS — M79.672 PAIN OF LEFT HEEL: Primary | ICD-10-CM

## 2024-07-24 DIAGNOSIS — Z72.52 HIGH RISK HOMOSEXUAL BEHAVIOR: ICD-10-CM

## 2024-07-24 RX ORDER — LOSARTAN POTASSIUM 50 MG/1
50 TABLET ORAL DAILY
Qty: 90 TABLET | Refills: 1 | Status: SHIPPED | OUTPATIENT
Start: 2024-07-24

## 2024-08-04 SDOH — ECONOMIC STABILITY: FOOD INSECURITY: WITHIN THE PAST 12 MONTHS, YOU WORRIED THAT YOUR FOOD WOULD RUN OUT BEFORE YOU GOT MONEY TO BUY MORE.: NEVER TRUE

## 2024-08-04 SDOH — ECONOMIC STABILITY: FOOD INSECURITY: WITHIN THE PAST 12 MONTHS, THE FOOD YOU BOUGHT JUST DIDN'T LAST AND YOU DIDN'T HAVE MONEY TO GET MORE.: NEVER TRUE

## 2024-08-04 SDOH — ECONOMIC STABILITY: INCOME INSECURITY: HOW HARD IS IT FOR YOU TO PAY FOR THE VERY BASICS LIKE FOOD, HOUSING, MEDICAL CARE, AND HEATING?: NOT HARD AT ALL

## 2024-08-07 ENCOUNTER — OFFICE VISIT (OUTPATIENT)
Age: 29
End: 2024-08-07
Payer: COMMERCIAL

## 2024-08-07 VITALS
RESPIRATION RATE: 16 BRPM | HEIGHT: 69 IN | SYSTOLIC BLOOD PRESSURE: 136 MMHG | DIASTOLIC BLOOD PRESSURE: 85 MMHG | OXYGEN SATURATION: 98 % | HEART RATE: 73 BPM | TEMPERATURE: 97.4 F | WEIGHT: 301 LBS | BODY MASS INDEX: 44.58 KG/M2

## 2024-08-07 DIAGNOSIS — Z72.52 HIGH RISK HOMOSEXUAL BEHAVIOR: ICD-10-CM

## 2024-08-07 DIAGNOSIS — R73.03 PREDIABETES: Primary | ICD-10-CM

## 2024-08-07 LAB
EST. AVERAGE GLUCOSE BLD GHB EST-MCNC: 117 MG/DL
HBA1C MFR BLD: 5.7 % (ref 4–5.6)
HBV SURFACE AG SER QL: <0.1 INDEX
HBV SURFACE AG SER QL: NEGATIVE
HCV AB SER IA-ACNC: 0.06 INDEX
HCV AB SERPL QL IA: NONREACTIVE
HIV 1+2 AB+HIV1 P24 AG SERPL QL IA: NONREACTIVE
HIV 1/2 RESULT COMMENT: NORMAL

## 2024-08-07 PROCEDURE — 3075F SYST BP GE 130 - 139MM HG: CPT | Performed by: STUDENT IN AN ORGANIZED HEALTH CARE EDUCATION/TRAINING PROGRAM

## 2024-08-07 PROCEDURE — 3079F DIAST BP 80-89 MM HG: CPT | Performed by: STUDENT IN AN ORGANIZED HEALTH CARE EDUCATION/TRAINING PROGRAM

## 2024-08-07 PROCEDURE — 99213 OFFICE O/P EST LOW 20 MIN: CPT | Performed by: STUDENT IN AN ORGANIZED HEALTH CARE EDUCATION/TRAINING PROGRAM

## 2024-08-07 RX ORDER — EMTRICITABINE AND TENOFOVIR ALAFENAMIDE 200; 25 MG/1; MG/1
1 TABLET ORAL DAILY
Qty: 30 TABLET | Refills: 3 | Status: SHIPPED | OUTPATIENT
Start: 2024-08-07

## 2024-08-07 SDOH — ECONOMIC STABILITY: INCOME INSECURITY: HOW HARD IS IT FOR YOU TO PAY FOR THE VERY BASICS LIKE FOOD, HOUSING, MEDICAL CARE, AND HEATING?: NOT HARD AT ALL

## 2024-08-07 SDOH — ECONOMIC STABILITY: FOOD INSECURITY: WITHIN THE PAST 12 MONTHS, YOU WORRIED THAT YOUR FOOD WOULD RUN OUT BEFORE YOU GOT MONEY TO BUY MORE.: NEVER TRUE

## 2024-08-07 SDOH — ECONOMIC STABILITY: FOOD INSECURITY: WITHIN THE PAST 12 MONTHS, THE FOOD YOU BOUGHT JUST DIDN'T LAST AND YOU DIDN'T HAVE MONEY TO GET MORE.: NEVER TRUE

## 2024-08-07 NOTE — PROGRESS NOTES
HISTORY OF PRESENT ILLNESS   Nora Good is a 29 y.o. male who presents for    High risk homosexual behavior: RECALL on PrEP, no SE of the medication. Pt has been counseled on safe sex practices.   - Today denies sores, bumps, rashes on genital or rectal area. No pain with urination or discharge from the penis or rectum. No bloody stool, or malodorous urine. No painful or swollen lymph nodes.      HTN: RECALL recently has been borderline controlled with losartan 25 mg daily.   Tolerating losartan. Not checking at home.      Obesity: recall - He has not been actively trying to lose weight but he has been watching what he eats. He has had a lot of other things going on, especially figuring his finances.     Kidney stones:  quiescent recently.     HM:   - refuses flu shot  - advised on obtaining covid booster.     SOCIAL HISTORY: works full time at [employer].     /85 (Site: Left Upper Arm, Position: Sitting, Cuff Size: Large Adult)   Pulse 73   Temp 97.4 °F (36.3 °C) (Temporal)   Resp 16   Ht 1.753 m (5' 9.02\")   Wt (!) 136.5 kg (301 lb)   SpO2 98%   BMI 44.43 kg/m²       Physical Exam  Constitutional:       General: He is not in acute distress.     Appearance: Normal appearance. He is not toxic-appearing.   HENT:      Head: Normocephalic and atraumatic.      Mouth/Throat:      Pharynx: No posterior oropharyngeal erythema.   Eyes:      General: No scleral icterus.     Extraocular Movements: Extraocular movements intact.      Conjunctiva/sclera:      Left eye: Left conjunctiva is not injected.   Cardiovascular:      Rate and Rhythm: Normal rate.   Pulmonary:      Effort: Pulmonary effort is normal.   Musculoskeletal:      Cervical back: No tenderness.   Skin:     General: Skin is warm and dry.   Neurological:      General: No focal deficit present.      Mental Status: He is alert.   Psychiatric:         Mood and Affect: Mood normal.            Current Outpatient Medications:     losartan (COZAAR) 50 MG

## 2024-08-07 NOTE — PROGRESS NOTES
\"Have you been to the ER, urgent care clinic since your last visit?  Hospitalized since your last visit?\"    NO    “Have you seen or consulted any other health care providers outside of [facility] since your last visit?”    Podiatrist  foot and ankle            Click Here for Release of Records Request

## 2024-08-07 NOTE — PATIENT INSTRUCTIONS
Check your blood pressure at home weekly and if the top number is consistently greater than 140 or the bottom number is consistently greater than 90, call to schedule an appointment.     Also call to schedule an appointment if the top number is consistently less than 100 or the bottom number is consistently less than 60.

## 2024-08-08 LAB — T PALLIDUM AB SER QL IA: NON REACTIVE

## 2024-08-12 ENCOUNTER — TELEPHONE (OUTPATIENT)
Age: 29
End: 2024-08-12

## 2024-11-14 ENCOUNTER — TELEPHONE (OUTPATIENT)
Age: 29
End: 2024-11-14

## 2024-11-14 NOTE — TELEPHONE ENCOUNTER
----- Message from Sienna VERNON sent at 11/13/2024  1:08 PM EST -----  Regarding: [facility] Appointment Request  [facility] Appointment Request    Patient needs appointment for [facility] Appointment Type: Existing Condition Follow Up.    Patient Requested Dates(s): any Fridays for this Month of November  Patient Requested Time: around 1:30 and or before 2:30  Provider Name: Wilfred Garcia MD      Reason for Appointment Request: Established Patient - No appointments available during search. Patient needs to be seen for this month any Fridays will do for his 3 months follow-up and also for his medication refill.  --------------------------------------------------------------------------------------------------------------------------    Relationship to Patient: Self     Call Back Information: OK to leave message on voicemail  Preferred Call Back Number: Phone

## 2025-01-26 DIAGNOSIS — Z72.52 HIGH RISK HOMOSEXUAL BEHAVIOR: ICD-10-CM

## 2025-01-27 RX ORDER — LOSARTAN POTASSIUM 50 MG/1
50 TABLET ORAL DAILY
Qty: 90 TABLET | Refills: 0 | Status: SHIPPED | OUTPATIENT
Start: 2025-01-27

## 2025-03-19 DIAGNOSIS — Z72.52 HIGH RISK HOMOSEXUAL BEHAVIOR: ICD-10-CM

## 2025-03-20 RX ORDER — EMTRICITABINE AND TENOFOVIR ALAFENAMIDE 200; 25 MG/1; MG/1
1 TABLET ORAL DAILY
Qty: 30 TABLET | Refills: 3 | OUTPATIENT
Start: 2025-03-20

## 2025-03-25 ENCOUNTER — TELEPHONE (OUTPATIENT)
Age: 30
End: 2025-03-25

## 2025-03-25 NOTE — TELEPHONE ENCOUNTER
Pt states would like an appt as soon as possible to discuss refills for emtricitabine-tenofovir alafenamide (DESCOVY) 200-25 MG TABS tablet       Offered next available, pt states would like something sooner.    Please call to schedule.

## 2025-04-03 ENCOUNTER — OFFICE VISIT (OUTPATIENT)
Age: 30
End: 2025-04-03
Payer: COMMERCIAL

## 2025-04-03 VITALS
DIASTOLIC BLOOD PRESSURE: 87 MMHG | WEIGHT: 300 LBS | OXYGEN SATURATION: 97 % | HEIGHT: 69 IN | RESPIRATION RATE: 20 BRPM | BODY MASS INDEX: 44.43 KG/M2 | TEMPERATURE: 97.9 F | HEART RATE: 80 BPM | SYSTOLIC BLOOD PRESSURE: 127 MMHG

## 2025-04-03 DIAGNOSIS — Z72.52 HIGH RISK HOMOSEXUAL BEHAVIOR: ICD-10-CM

## 2025-04-03 DIAGNOSIS — I10 ESSENTIAL (PRIMARY) HYPERTENSION: ICD-10-CM

## 2025-04-03 DIAGNOSIS — R73.03 PREDIABETES: Primary | ICD-10-CM

## 2025-04-03 DIAGNOSIS — E66.9 OBESITY WITHOUT SERIOUS COMORBIDITY, UNSPECIFIED CLASS, UNSPECIFIED OBESITY TYPE: ICD-10-CM

## 2025-04-03 LAB
ALBUMIN SERPL-MCNC: 4.1 G/DL (ref 3.5–5)
ALBUMIN/GLOB SERPL: 1.1 (ref 1.1–2.2)
ALP SERPL-CCNC: 74 U/L (ref 45–117)
ALT SERPL-CCNC: 50 U/L (ref 12–78)
ANION GAP SERPL CALC-SCNC: 1 MMOL/L (ref 2–12)
AST SERPL-CCNC: 29 U/L (ref 15–37)
BILIRUB SERPL-MCNC: 0.6 MG/DL (ref 0.2–1)
BUN SERPL-MCNC: 13 MG/DL (ref 6–20)
BUN/CREAT SERPL: 11 (ref 12–20)
CALCIUM SERPL-MCNC: 9.6 MG/DL (ref 8.5–10.1)
CHLORIDE SERPL-SCNC: 105 MMOL/L (ref 97–108)
CHOLEST SERPL-MCNC: 145 MG/DL
CO2 SERPL-SCNC: 31 MMOL/L (ref 21–32)
CREAT SERPL-MCNC: 1.19 MG/DL (ref 0.7–1.3)
EST. AVERAGE GLUCOSE BLD GHB EST-MCNC: 117 MG/DL
GLOBULIN SER CALC-MCNC: 3.6 G/DL (ref 2–4)
GLUCOSE SERPL-MCNC: 91 MG/DL (ref 65–100)
HBA1C MFR BLD: 5.7 % (ref 4–5.6)
HBV SURFACE AG SER QL: <0.1 INDEX
HBV SURFACE AG SER QL: NEGATIVE
HCV AB SER IA-ACNC: 0.04 INDEX
HCV AB SERPL QL IA: NONREACTIVE
HDLC SERPL-MCNC: 42 MG/DL
HDLC SERPL: 3.5 (ref 0–5)
HIV 1+2 AB+HIV1 P24 AG SERPL QL IA: NONREACTIVE
HIV 1/2 RESULT COMMENT: NORMAL
LDLC SERPL CALC-MCNC: 92.6 MG/DL (ref 0–100)
POTASSIUM SERPL-SCNC: 4.3 MMOL/L (ref 3.5–5.1)
PROT SERPL-MCNC: 7.7 G/DL (ref 6.4–8.2)
SODIUM SERPL-SCNC: 137 MMOL/L (ref 136–145)
TRIGL SERPL-MCNC: 52 MG/DL
VLDLC SERPL CALC-MCNC: 10.4 MG/DL

## 2025-04-03 PROCEDURE — 99214 OFFICE O/P EST MOD 30 MIN: CPT | Performed by: STUDENT IN AN ORGANIZED HEALTH CARE EDUCATION/TRAINING PROGRAM

## 2025-04-03 PROCEDURE — 3079F DIAST BP 80-89 MM HG: CPT | Performed by: STUDENT IN AN ORGANIZED HEALTH CARE EDUCATION/TRAINING PROGRAM

## 2025-04-03 PROCEDURE — 3074F SYST BP LT 130 MM HG: CPT | Performed by: STUDENT IN AN ORGANIZED HEALTH CARE EDUCATION/TRAINING PROGRAM

## 2025-04-03 RX ORDER — LOSARTAN POTASSIUM 50 MG/1
50 TABLET ORAL DAILY
Qty: 90 TABLET | Refills: 1 | Status: SHIPPED | OUTPATIENT
Start: 2025-04-03

## 2025-04-03 RX ORDER — EMTRICITABINE AND TENOFOVIR ALAFENAMIDE 200; 25 MG/1; MG/1
1 TABLET ORAL DAILY
Qty: 90 TABLET | Refills: 1 | Status: SHIPPED | OUTPATIENT
Start: 2025-04-03

## 2025-04-03 SDOH — ECONOMIC STABILITY: INCOME INSECURITY: IN THE LAST 12 MONTHS, WAS THERE A TIME WHEN YOU WERE NOT ABLE TO PAY THE MORTGAGE OR RENT ON TIME?: NO

## 2025-04-03 SDOH — ECONOMIC STABILITY: TRANSPORTATION INSECURITY
IN THE PAST 12 MONTHS, HAS THE LACK OF TRANSPORTATION KEPT YOU FROM MEDICAL APPOINTMENTS OR FROM GETTING MEDICATIONS?: NO

## 2025-04-03 SDOH — ECONOMIC STABILITY: FOOD INSECURITY: WITHIN THE PAST 12 MONTHS, THE FOOD YOU BOUGHT JUST DIDN'T LAST AND YOU DIDN'T HAVE MONEY TO GET MORE.: NEVER TRUE

## 2025-04-03 SDOH — ECONOMIC STABILITY: FOOD INSECURITY: WITHIN THE PAST 12 MONTHS, YOU WORRIED THAT YOUR FOOD WOULD RUN OUT BEFORE YOU GOT MONEY TO BUY MORE.: NEVER TRUE

## 2025-04-03 ASSESSMENT — PATIENT HEALTH QUESTIONNAIRE - PHQ9
1. LITTLE INTEREST OR PLEASURE IN DOING THINGS: NOT AT ALL
SUM OF ALL RESPONSES TO PHQ QUESTIONS 1-9: 0
2. FEELING DOWN, DEPRESSED OR HOPELESS: NOT AT ALL

## 2025-04-03 NOTE — PROGRESS NOTES
HISTORY OF PRESENT ILLNESS   Nora Good is a 29 y.o. male who presents for    High risk homosexual behavior: RECALL on PrEP, no SE of the medication. Pt has been counseled on safe sex practices.   - Today denies sores, bumps, rashes on genital or rectal area. No pain with urination or discharge from the penis or rectum. No bloody stool, or malodorous urine. No painful or swollen lymph nodes.      HTN: RECALL recently has been borderline controlled with losartan 25 mg daily.   Tolerating losartan. Not checking at home.      Obesity: recall - He has not been actively trying to lose weight but he has been watching what he eats. He has had a lot of other things going on, especially figuring his finances.   He is getting minimum of 10k steps per day.     Kidney stones:  quiescent recently.     HM:   - refuses flu shot  - advised on obtaining covid booster.     SOCIAL HISTORY: works full time at [employer].     Last Weight Metrics:      4/3/2025    10:08 AM 8/7/2024     7:55 AM 5/15/2024    11:48 AM 1/30/2024     2:51 PM 11/2/2023    10:29 AM 8/1/2023    11:09 AM 4/25/2023     9:54 AM   Weight Loss Metrics   Height 5' 9.016\" 5' 9.016\"  5' 9\" 5' 9\" 5' 9\" 5' 9\"   Weight - Scale 300 lbs 301 lbs 306 lbs 303 lbs 10 oz 308 lbs 6 oz 306 lbs 308 lbs   BMI (Calculated) 44.4 kg/m2 44.5 kg/m2 0 kg/m2 44.9 kg/m2 45.6 kg/m2 45.3 kg/m2 45.6 kg/m2     /87   Pulse 80   Temp 97.9 °F (36.6 °C) (Temporal)   Resp 20   Ht 1.753 m (5' 9.02\")   Wt 136.1 kg (300 lb)   SpO2 97%   BMI 44.28 kg/m²       Physical Exam  Constitutional:       General: He is not in acute distress.     Appearance: Normal appearance. He is not toxic-appearing.   HENT:      Head: Normocephalic and atraumatic.      Mouth/Throat:      Pharynx: No posterior oropharyngeal erythema.   Eyes:      General: No scleral icterus.     Extraocular Movements: Extraocular movements intact.      Conjunctiva/sclera:      Left eye: Left conjunctiva is not injected.

## 2025-04-04 LAB — T PALLIDUM AB SER QL IA: NON REACTIVE

## 2025-04-21 ENCOUNTER — RESULTS FOLLOW-UP (OUTPATIENT)
Age: 30
End: 2025-04-21

## 2025-07-24 ENCOUNTER — PATIENT MESSAGE (OUTPATIENT)
Age: 30
End: 2025-07-24

## 2025-08-13 ENCOUNTER — OFFICE VISIT (OUTPATIENT)
Age: 30
End: 2025-08-13
Payer: COMMERCIAL

## 2025-08-13 VITALS
WEIGHT: 309 LBS | HEART RATE: 61 BPM | RESPIRATION RATE: 16 BRPM | TEMPERATURE: 97.7 F | OXYGEN SATURATION: 96 % | SYSTOLIC BLOOD PRESSURE: 138 MMHG | HEIGHT: 69 IN | BODY MASS INDEX: 45.77 KG/M2 | DIASTOLIC BLOOD PRESSURE: 88 MMHG

## 2025-08-13 DIAGNOSIS — Z72.52 HIGH RISK HOMOSEXUAL BEHAVIOR: ICD-10-CM

## 2025-08-13 DIAGNOSIS — I10 ESSENTIAL (PRIMARY) HYPERTENSION: Primary | ICD-10-CM

## 2025-08-13 LAB
HBV SURFACE AG SER QL: NONREACTIVE
HCV AB SER QL: NONREACTIVE
HIV 1+2 AB+HIV1 P24 AG SERPL QL IA: NONREACTIVE
HIV 1/2 RESULT COMMENT: NORMAL

## 2025-08-13 PROCEDURE — 99214 OFFICE O/P EST MOD 30 MIN: CPT | Performed by: STUDENT IN AN ORGANIZED HEALTH CARE EDUCATION/TRAINING PROGRAM

## 2025-08-13 PROCEDURE — 3079F DIAST BP 80-89 MM HG: CPT | Performed by: STUDENT IN AN ORGANIZED HEALTH CARE EDUCATION/TRAINING PROGRAM

## 2025-08-13 PROCEDURE — 3075F SYST BP GE 130 - 139MM HG: CPT | Performed by: STUDENT IN AN ORGANIZED HEALTH CARE EDUCATION/TRAINING PROGRAM

## 2025-08-13 RX ORDER — EMTRICITABINE AND TENOFOVIR ALAFENAMIDE 200; 25 MG/1; MG/1
1 TABLET ORAL DAILY
Qty: 90 TABLET | Refills: 1 | Status: SHIPPED | OUTPATIENT
Start: 2025-08-13

## 2025-08-13 RX ORDER — LOSARTAN POTASSIUM 50 MG/1
50 TABLET ORAL DAILY
Qty: 90 TABLET | Refills: 1 | Status: SHIPPED | OUTPATIENT
Start: 2025-08-13

## 2025-08-13 ASSESSMENT — PATIENT HEALTH QUESTIONNAIRE - PHQ9
SUM OF ALL RESPONSES TO PHQ QUESTIONS 1-9: 0
SUM OF ALL RESPONSES TO PHQ QUESTIONS 1-9: 0
2. FEELING DOWN, DEPRESSED OR HOPELESS: NOT AT ALL
1. LITTLE INTEREST OR PLEASURE IN DOING THINGS: NOT AT ALL
SUM OF ALL RESPONSES TO PHQ QUESTIONS 1-9: 0
SUM OF ALL RESPONSES TO PHQ QUESTIONS 1-9: 0

## 2025-08-14 LAB — T PALLIDUM AB SER QL IA: NON REACTIVE

## 2025-08-29 LAB — T PALLIDUM AB SER QL IA: NON REACTIVE
